# Patient Record
Sex: MALE | Race: WHITE | Employment: STUDENT | URBAN - METROPOLITAN AREA
[De-identification: names, ages, dates, MRNs, and addresses within clinical notes are randomized per-mention and may not be internally consistent; named-entity substitution may affect disease eponyms.]

---
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
## 2023-03-16 ENCOUNTER — OFFICE VISIT (OUTPATIENT)
Dept: INTERNAL MEDICINE | Facility: CLINIC | Age: 18
End: 2023-03-16
Payer: COMMERCIAL

## 2023-03-16 VITALS
RESPIRATION RATE: 12 BRPM | OXYGEN SATURATION: 99 % | DIASTOLIC BLOOD PRESSURE: 67 MMHG | BODY MASS INDEX: 25.48 KG/M2 | HEART RATE: 51 BPM | HEIGHT: 69 IN | WEIGHT: 172 LBS | SYSTOLIC BLOOD PRESSURE: 111 MMHG | TEMPERATURE: 98.4 F

## 2023-03-16 DIAGNOSIS — Z00.00 ROUTINE HISTORY AND PHYSICAL EXAMINATION OF ADULT: Primary | ICD-10-CM

## 2023-03-16 PROCEDURE — 99395 PREV VISIT EST AGE 18-39: CPT | Performed by: INTERNAL MEDICINE

## 2023-03-16 ASSESSMENT — ENCOUNTER SYMPTOMS
PALPITATIONS: 0
HEARTBURN: 0
PARESTHESIAS: 0
FREQUENCY: 0
MYALGIAS: 0
FEVER: 0
SORE THROAT: 0
JOINT SWELLING: 0
HEADACHES: 0
ARTHRALGIAS: 0
NERVOUS/ANXIOUS: 0
DIARRHEA: 0
SHORTNESS OF BREATH: 0
DYSURIA: 0
CONSTIPATION: 0
ABDOMINAL PAIN: 0
HEMATOCHEZIA: 0
COUGH: 0
HEMATURIA: 0
EYE PAIN: 0
WEAKNESS: 0
DIZZINESS: 0
CHILLS: 0
NAUSEA: 0

## 2023-03-16 NOTE — PROGRESS NOTES
SUBJECTIVE:   CC: Wai is an 17 year old who presents for preventative health visit.     Patient has been advised of split billing requirements and indicates understanding: Yes     Healthy Habits:     Getting at least 3 servings of Calcium per day:  Yes    Bi-annual eye exam:  Yes    Dental care twice a year:  Yes    Sleep apnea or symptoms of sleep apnea:  None    Diet:  Regular (no restrictions)    Frequency of exercise:  4-5 days/week    Duration of exercise:  Greater than 60 minutes    Taking medications regularly:  Yes    Medication side effects:  None    PHQ-2 Total Score: 0    Additional concerns today:  No    Takes no regularly scheduled medications     Patient brought with him today, a hand written page of notes about his family history and is asking some questions     1. Father at age of 49 had a big colon polyp, non-cancerous and several smaller colon polyps removed, when should this patient start with colon cancer screening  ?    2. Father,mother and maternal grandparents have anxiety     3. Also running in his family is diabetes mellitus type 2  And esophageal cancer in paternal grandmother , this was diagnosed when she was 68 years old.    I shared that I thought he should perhaps start with colon cancer at 38 but beyond this I don't think these are inherited diseases. I agreed to ask an informal question to the genetic counselor but in my opinion there's not enough here to warrant a separate specific office visit with a genetic counseling provider.    Today's PHQ-2 Score:   PHQ-2 ( 1999 [employer]) 3/16/2023   Q1: Little interest or pleasure in doing things 0   Q2: Feeling down, depressed or hopeless 0   PHQ-2 Score 0   PHQ-2 Total Score (12-17 Years)- Positive if 3 or more points; Administer PHQ-A if positive -   Q1: Little interest or pleasure in doing things Not at all   Q2: Feeling down, depressed or hopeless Not at all   PHQ-2 Score 0           Social History     Tobacco Use     Smoking status: Never      Smokeless tobacco: Never   Substance Use Topics     Alcohol use: No         Alcohol Use 3/16/2023   Prescreen: >3 drinks/day or >7 drinks/week? No   No flowsheet data found.    Last PSA: No results found for: PSA    Reviewed orders with patient. Reviewed health maintenance and updated orders accordingly - Yes  Lab work is in process  Labs reviewed in EPIC  BP Readings from Last 3 Encounters:   03/16/23 111/67   12/06/21 132/79   12/04/21 131/74    Wt Readings from Last 3 Encounters:   03/16/23 78 kg (172 lb) (74 %, Z= 0.65)*   08/14/22 69.4 kg (153 lb) (52 %, Z= 0.04)*   12/06/21 69.4 kg (153 lb) (56 %, Z= 0.15)*     * Growth percentiles are based on [facility] (Boys, 2-20 Years) data.                  Patient Active Problem List   Diagnosis     Restless leg syndrome     Flexural atopic dermatitis     KP (keratosis pilaris)     Family history of polyps in the colon     Nevus     Anxiety     No past surgical history on file.    Social History     Tobacco Use     Smoking status: Never     Smokeless tobacco: Never   Substance Use Topics     Alcohol use: No     Family History   Problem Relation Age of Onset     No Known Problems Mother      No Known Problems Father          No current outpatient medications on file.     No Known Allergies  No lab results found.     Reviewed and updated as needed this visit by clinical staff    Allergies  Meds              Reviewed and updated as needed this visit by Provider                     Review of Systems  CONSTITUTIONAL: NEGATIVE for fever, chills, change in weight  INTEGUMENTARY/SKIN: NEGATIVE for worrisome rashes, moles or lesions  EYES: NEGATIVE for vision changes or irritation  ENT: NEGATIVE for ear, mouth and throat problems  RESP: NEGATIVE for significant cough or SOB  CV: NEGATIVE for chest pain, palpitations or peripheral edema  GI: NEGATIVE for nausea, abdominal pain, heartburn, or change in bowel habits   male: negative for dysuria, hematuria, decreased urinary  "stream, erectile dysfunction, urethral discharge  MUSCULOSKELETAL: NEGATIVE for significant arthralgias or myalgia  NEURO: NEGATIVE for weakness, dizziness or paresthesias  PSYCHIATRIC: NEGATIVE for changes in mood or affect    Patient has some intermittent on and off right shoulder pain.     Health Maintenance Due   Topic Date Due     ANNUAL REVIEW OF  ORDERS  Never done     HIV SCREENING  Never done     HEPATITIS C SCREENING  Never done     YEARLY PREVENTIVE VISIT  03/11/2022      Patient declines laboratory studies  Self-testicular exams encouraged     OBJECTIVE:   /67   Pulse 51   Temp 98.4  F (36.9  C)   Resp 12   Ht 5' 9\" (1.753 m)   Wt 172 lb (78 kg)   SpO2 99%   BMI 25.40 kg/m      Physical Exam  GENERAL: healthy, alert and no distress  EYES: Eyes grossly normal to inspection, PERRL and conjunctivae and sclerae normal  HENT: ear canals and TM's normal, nose and mouth without ulcers or lesions  NECK: no adenopathy, no asymmetry, masses, or scars and thyroid normal to palpation  RESP: lungs clear to auscultation - no rales, rhonchi or wheezes  CV: regular rate and rhythm, normal S1 S2, no S3 or S4, no murmur, click or rub, no peripheral edema and peripheral pulses strong  ABDOMEN: soft, nontender, no hepatosplenomegaly, no masses and bowel sounds normal  MS: no gross musculoskeletal defects noted, no edema  SKIN: no suspicious lesions or rashes  NEURO: Normal strength and tone, mentation intact and speech normal  PSYCH: mentation appears normal, affect normal/bright    Diagnostic Test Results:  Labs reviewed in Epic    ASSESSMENT/PLAN:   (Z00.00) Routine history and physical examination of adult  (primary encounter diagnosis)  Comment: routine screening issues   Plan: see orders section of this encounter     Patient has been advised of split billing requirements and indicates understanding: Yes      COUNSELING:   Reviewed preventive health counseling, as reflected in patient " "instructions      BMI:   Estimated body mass index is 25.4 kg/m  as calculated from the following:    Height as of this encounter: 5' 9\" (1.753 m).    Weight as of this encounter: 172 lb (78 kg).         He reports that he has never smoked. He has never used smokeless tobacco.            Isidoro Marcos MD  [facility]  "

## 2023-03-16 NOTE — Clinical Note
I want to ask these questions of a genetic counseling provider. I don't think any of this rise to the level of warranting a formal consult but would appreciate feedback !  Patient brought with him today, a hand written page of notes about his family history and is asking some questions   1. Father at age of 49 had a big colon polyp, non-cancerous and several smaller colon polyps removed, when should this patient start with colon cancer screening  ?  2. Father,mother and maternal grandparents have anxiety   3. Also running in his family is diabetes mellitus type 2  And esophageal cancer in paternal grandmother , this was diagnosed when she was 68 years old.  I shared that I thought he should perhaps start with colon cancer at 38 but beyond this I don't think these are inherited diseases. I agreed to ask an informal question to the genetic counselor but in my opinion there's not enough here to warrant a separate specific office visit with a genetic counseling provider.

## 2023-03-17 ENCOUNTER — TELEPHONE (OUTPATIENT)
Dept: INTERNAL MEDICINE | Facility: CLINIC | Age: 18
End: 2023-03-17

## 2023-03-17 NOTE — TELEPHONE ENCOUNTER
Isidoro Marcos MD  [facility]  I want to ask these questions of a genetic counseling provider. I don't think any of this rise to the level of warranting a formal consult but would appreciate feedback !     Patient brought with him today, a hand written page of notes about his family history and is asking some questions     1. Father at age of 49 had a big colon polyp, non-cancerous and several smaller colon polyps removed, when should this patient start with colon cancer screening  ?     2. Father,mother and maternal grandparents have anxiety     3. Also running in his family is diabetes mellitus type 2  And esophageal cancer in paternal grandmother , this was diagnosed when she was 68 years old.     I shared that I thought he should perhaps start with colon cancer at 38 but beyond this I don't think these are inherited diseases. I agreed to ask an informal question to the genetic counselor but in my opinion there's not enough here to warrant a separate specific office visit with a genetic counseling provider.

## 2024-02-15 ENCOUNTER — PATIENT OUTREACH (OUTPATIENT)
Dept: CARE COORDINATION | Facility: CLINIC | Age: 19
End: 2024-02-15
Payer: COMMERCIAL

## 2024-02-29 ENCOUNTER — PATIENT OUTREACH (OUTPATIENT)
Dept: CARE COORDINATION | Facility: CLINIC | Age: 19
End: 2024-02-29
Payer: COMMERCIAL

## 2024-06-02 ENCOUNTER — MYC MEDICAL ADVICE (OUTPATIENT)
Dept: INTERNAL MEDICINE | Facility: CLINIC | Age: 19
End: 2024-06-02
Payer: COMMERCIAL

## 2024-06-03 ENCOUNTER — NURSE TRIAGE (OUTPATIENT)
Dept: FAMILY MEDICINE | Facility: CLINIC | Age: 19
End: 2024-06-03
Payer: COMMERCIAL

## 2024-06-03 NOTE — TELEPHONE ENCOUNTER
Pt is having shoulder popping and when this happens it hurts and then goes in place. Then it is really sore for a day.   He states that he could barely lift up his arm for a couple of days after it happened, but now he doesn't have any pain.  He is not sure if it is just one specific shoulder.   Having trouble with right shoulder right now  The other day when he lifted it up, he heard a loud cracking sound.  States that he does lift weights at the gym.  He denies any swelling, weakness, or numbness.  Pt denying any shoulder or arm pain currently.   He wanted to schedule appointment with PCP to discuss further or see if referral for PT would be a good idea.  Appt scheduled.    Reason for Disposition   Caused by overuse from recent vigorous activity (e.g., sports, lifting, physical work)    Additional Information   Negative: Shock suspected (e.g., cold/pale/clammy skin, too weak to stand, low BP, rapid pulse)   Negative: Similar pain previously and it was from 'heart attack'   Negative: Similar pain previously from 'angina' and not relieved by nitroglycerin   Negative: Sounds like a life-threatening emergency to the triager   Negative: Followed an injury to arm   Negative: Chest pain   Negative: Wound looks infected   Negative: Elbow pain is main symptom   Negative: Hand or wrist pain is main symptom   Negative: Fever and red area (or area very tender to touch)   Negative: Swollen joint and fever   Negative: Entire arm is swollen   Negative: Patient sounds very sick or weak to the triager   Negative: Difficulty breathing or unusual sweating (e.g., sweating without exertion)   Negative: Chest pain lasting longer than 5 minutes   Negative: Age > 40 and no obvious cause for pain, pain still present even when not moving the arm   Negative: SEVERE pain (e.g., excruciating, unable to do any normal activities)   Negative: Red area or streak > 2 inches (or 5 cm)   Negative: Cast on wrist or arm and now increasing pain    Negative: Weakness (i.e., loss of strength) in hand or fingers  (Exception: Not truly weak; hand feels weak because of pain.)   Negative: Arm pains with exertion (e.g., occurs with walking; goes away on resting)   Negative: MILD pain (e.g., does not interfere with normal activities) and present > 7 days   Negative: MODERATE pain (e.g., interferes with normal activities) and present > 3 days   Negative: Pain is worsened or caused by bending the neck   Negative: Painful rash with multiple small blisters grouped together (i.e., dermatomal distribution or 'band' or 'stripe')   Negative: Looks like a boil, infected sore, deep ulcer, or other infected rash (spreading redness, pus)   Negative: Localized rash is very painful (no fever)   Negative: Numbness (i.e., loss of sensation) in hand or fingers   Negative: Localized pain, redness or hard lump along vein   Negative: Patient wants to be seen    Protocols used: Arm Pain-A-[place]  Sherie Alonso RN

## 2024-06-03 NOTE — TELEPHONE ENCOUNTER
Left message on answering machine for patient to call back to the nurse at [phone].    Please triage shoulder pain when patient returns call.    Britney Hernandez RN  [facility]

## 2024-06-22 ENCOUNTER — HEALTH MAINTENANCE LETTER (OUTPATIENT)
Age: 19
End: 2024-06-22

## 2024-07-18 ENCOUNTER — OFFICE VISIT (OUTPATIENT)
Dept: INTERNAL MEDICINE | Facility: CLINIC | Age: 19
End: 2024-07-18
Payer: COMMERCIAL

## 2024-07-18 VITALS
HEART RATE: 67 BPM | RESPIRATION RATE: 16 BRPM | DIASTOLIC BLOOD PRESSURE: 71 MMHG | SYSTOLIC BLOOD PRESSURE: 124 MMHG | WEIGHT: 176 LBS | OXYGEN SATURATION: 98 % | TEMPERATURE: 98.6 F | BODY MASS INDEX: 26.07 KG/M2 | HEIGHT: 69 IN

## 2024-07-18 DIAGNOSIS — Z11.59 NEED FOR HEPATITIS C SCREENING TEST: ICD-10-CM

## 2024-07-18 DIAGNOSIS — G89.29 CHRONIC PAIN OF BOTH SHOULDERS: Primary | ICD-10-CM

## 2024-07-18 DIAGNOSIS — Z11.4 SCREENING FOR HIV (HUMAN IMMUNODEFICIENCY VIRUS): ICD-10-CM

## 2024-07-18 DIAGNOSIS — M25.511 CHRONIC PAIN OF BOTH SHOULDERS: Primary | ICD-10-CM

## 2024-07-18 DIAGNOSIS — M25.512 CHRONIC PAIN OF BOTH SHOULDERS: Primary | ICD-10-CM

## 2024-07-18 PROCEDURE — 99213 OFFICE O/P EST LOW 20 MIN: CPT | Performed by: INTERNAL MEDICINE

## 2024-07-18 ASSESSMENT — PAIN SCALES - GENERAL: PAINLEVEL: NO PAIN (0)

## 2024-07-18 NOTE — PROGRESS NOTES
"  Assessment & Plan     Chronic pain of both shoulders  Seeing this patient for a chronic problem with his bilateral shoulders although by far left greater then right side is the problem. He has episodes where, especially associated with his weight lifting he notes at times a popping sound and then has a lingering pain with the shoulder for as long as a week. Given the fact that this has been a persistent problem for a year I am concerned with what the future holds  for this gentleman and while he has a normal exam today with no pain and a full range of motion with both shoulders and what sounds like normal muscle power, tone and bulk , his history concerns me and I felt a session with the physical therapy was the logical next step   - REVIEW OF HEALTH MAINTENANCE PROTOCOL ORDERS  - Physical Therapy  Referral; Future    Screening for HIV (human immunodeficiency virus)  Postponed     Need for hepatitis C screening test  Postponed           BMI  Estimated body mass index is 25.83 kg/m  as calculated from the following:    Height as of this encounter: 1.758 m (5' 9.21\").    Weight as of this encounter: 79.8 kg (176 lb).       Aaron Carreno is a 18 year old, presenting for the following health issues:  Shoulder (Bilateral shoulder pain and cracking)      7/18/2024    12:00 PM   Additional Questions   Roomed by Olga     History of Present Illness       Reason for visit:  Shoulder pain  Symptom onset:  More than a month  Symptoms include:  Pain in shoulder while working out and pain when putting shoulder in a weird position  Symptom intensity:  Moderate  Symptom progression:  Improving  Had these symptoms before:  Yes  Has tried/received treatment for these symptoms:  No  What makes it worse:  Working out when it already hurts  What makes it better:  Taking a break from the gym    He eats 2-3 servings of fruits and vegetables daily.He consumes 1 sweetened beverage(s) daily.He exercises with enough effort to " "increase his heart rate 60 or more minutes per day.  He exercises with enough effort to increase his heart rate 5 days per week.   He is taking medications regularly.     Symptoms have been present for around a year   Not constant pain  Sometimes just from overstretching he gets a popping sound and from thsese events he has a [pain that lingers for 4-6 days     He tries to to power weight lifting and has bilateral shoulder problems         Review of Systems  Constitutional, HEENT, cardiovascular, pulmonary, gi and gu systems are negative, except as otherwise noted.      Objective    /71   Pulse 67   Temp 98.6  F (37  C) (Temporal)   Resp 16   Ht 1.758 m (5' 9.21\")   Wt 79.8 kg (176 lb)   SpO2 98%   BMI 25.83 kg/m    Body mass index is 25.83 kg/m .  Physical Exam   GENERAL: alert and no distress  EYES: Eyes grossly normal to inspection, PERRL and conjunctivae and sclerae normal  MS: no gross musculoskeletal defects noted, no edema  NEURO: Normal strength and tone, mentation intact and speech normal  PSYCH: mentation appears normal, affect normal/bright    Orders Placed This Encounter   Procedures    REVIEW OF HEALTH MAINTENANCE PROTOCOL ORDERS    Physical Therapy  Referral             Signed Electronically by: Isidoro Marcos MD    "

## 2024-08-13 ENCOUNTER — THERAPY VISIT (OUTPATIENT)
Dept: PHYSICAL THERAPY | Facility: CLINIC | Age: 19
End: 2024-08-13
Attending: INTERNAL MEDICINE
Payer: COMMERCIAL

## 2024-08-13 DIAGNOSIS — G89.29 CHRONIC PAIN OF BOTH SHOULDERS: ICD-10-CM

## 2024-08-13 DIAGNOSIS — M25.512 CHRONIC PAIN OF BOTH SHOULDERS: ICD-10-CM

## 2024-08-13 DIAGNOSIS — M25.511 CHRONIC PAIN OF BOTH SHOULDERS: ICD-10-CM

## 2024-08-13 PROCEDURE — 97161 PT EVAL LOW COMPLEX 20 MIN: CPT | Mod: GP | Performed by: PHYSICAL THERAPIST

## 2024-08-13 PROCEDURE — 97110 THERAPEUTIC EXERCISES: CPT | Mod: GP | Performed by: PHYSICAL THERAPIST

## 2024-08-13 PROCEDURE — 97530 THERAPEUTIC ACTIVITIES: CPT | Mod: GP | Performed by: PHYSICAL THERAPIST

## 2024-08-13 ASSESSMENT — ACTIVITIES OF DAILY LIVING (ADL)
WASHING_YOUR_HAIR?: 0
PUTTING_ON_AN_UNDERSHIRT_OR_A_PULLOVER_SWEATER: 3
AT_ITS_WORST?: 9
PUTTING_ON_YOUR_PANTS: 0
CARRYING_A_HEAVY_OBJECT_OF_10_POUNDS: 6
REMOVING_SOMETHING_FROM_YOUR_BACK_POCKET: 0
PLEASE_INDICATE_YOR_PRIMARY_REASON_FOR_REFERRAL_TO_THERAPY:: SHOULDER
PUTTING_ON_A_SHIRT_THAT_BUTTONS_DOWN_THE_FRONT: 0
PLACING_AN_OBJECT_ON_A_HIGH_SHELF: 5
WASHING_YOUR_BACK: 0
WHEN_LYING_ON_THE_INVOLVED_SIDE: 6
PUSHING_WITH_THE_INVOLVED_ARM: 3
REACHING_FOR_SOMETHING_ON_A_HIGH_SHELF: 5

## 2024-08-13 NOTE — PROGRESS NOTES
PHYSICAL THERAPY EVALUATION  Type of Visit: Evaluation              Subjective   Shoulder pain began a little over 2 years ago and has been progressively getting worse especially within the last 3 months. He has began to notice his shoulders popping out and going quickly back in - he will have pain and soreness for several days after this happens. The last time this happens was 3 days ago on the right shoulder when he was going to turn the key to start his car. Denies any history of dislocations in shoulders or any joint.      Presenting condition or subjective complaint: when extending my arm in no specific way,sometimes it feels like my shoulder pops out of place. This causes extreme pain then pops right back in place. then depending on how bad the incident was, it will be sore for around a week.  Date of onset:      Relevant medical history:   none  Dates & types of surgery:  none    Prior diagnostic imaging/testing results:     none  Prior therapy history for the same diagnosis, illness or injury: No      Prior Level of Function  Transfers: Independent  Ambulation: Independent  ADL: Independent  IADL:     Living Environment  Social support: With family members   Type of home: House; Multi-level; Basement   Stairs to enter the home: No       Ramp: No   Stairs inside the home: Yes 2 Is there a railing: Yes     Help at home: Self Cares (home health aide/personal care attendant, family, etc); Home management tasks (cooking, cleaning); Medication and/or finances  Equipment owned:       Employment:    college student  Hobbies/Interests:      Patient goals for therapy: I would like it to stop happening all together becuase it prevents me from doing certain exercises when working out and is very painful.    Pain assessment: See objective evaluation for additional pain details     Objective   SHOULDER EVALUATION  PAIN: Pain Level at Rest: 4/10  Pain Level with Use: 7/10  Pain Location: anterior shoulder  Pain Quality:  Aching and Sharp  Pain Frequency: intermittent  Pain is Worst: no pattern  Pain is Exacerbated By: reaching out in front of him away from body, reaching overhead forcefully   Pain is Relieved By: rest  Pain Progression: Worsened  INTEGUMENTARY (edema, incisions): WNL  POSTURE: Sitting Posture: Rounded shoulders, Forward head, Thoracic kyphosis increased  GAIT:   Weightbearing Status: WBAT  Assistive Device(s): None  Gait Deviations: WNL  BALANCE/PROPRIOCEPTION:  not tested  WEIGHTBEARING ALIGNMENT:   ROM: AROM WNL pain at end range flexion, abduction. Cervical AROM WNL    STRENGTH:  ER 4/5 bilat, abd 4+/5 bilat, flexion 4+/5 bilat, IR WNL  FLEXIBILITY:  decreased pec minor bilat  SPECIAL TESTS:    Left Right   Impingement     Neer's Positive Positive   Labral     Anterior Slide     [place]'s Negative  Negative    Crank     Instability     Apprehension (anterior) Negative  Negative    Relocation (anterior)     Anterior Load & Shift Negative  Negative    Posterior Load & Shift Negative  Negative    Posterior instability (with 90 degrees flex)     Multi-Directional Instability      Sulcus     Biceps      Speed's Negative  Negative    Rotator Cuff Tear     Drop Arm Negative  Negative    Belly Press     Lift off  Negative  Negative      PALPATION:  tender bilat supraspinatus, Uts, levators, pec minor  JOINT MOBILITY: not assessed d/t pain, will continue to assess  CERVICAL SCREEN: WNL    Assessment & Plan   CLINICAL IMPRESSIONS  Medical Diagnosis: B shoulder pain    Treatment Diagnosis: B shoulder pain   Impression/Assessment: Patient is a 18 year old male with B shoulder instability complaints.  The following significant findings have been identified: Pain, Decreased ROM/flexibility, Decreased joint mobility, Decreased strength, Decreased proprioception, and Impaired muscle performance. These impairments interfere with their ability to perform self care tasks, work tasks, recreational activities, household chores,  driving , household mobility, and community mobility as compared to previous level of function.     Clinical Decision Making (Complexity):  Clinical Presentation: Stable/Uncomplicated  Clinical Presentation Rationale: based on medical and personal factors listed in PT evaluation  Clinical Decision Making (Complexity): Low complexity    PLAN OF CARE  Treatment Interventions:  Interventions: Manual Therapy, Neuromuscular Re-education, Therapeutic Activity, Therapeutic Exercise    Long Term Goals            Frequency of Treatment:    Duration of Treatment:      Recommended Referrals to Other Professionals:   Education Assessment:        Risks and benefits of evaluation/treatment have been explained.   Patient/Family/caregiver agrees with Plan of Care.     Evaluation Time:             Signing Clinician: Tatiana Carreon, PT

## 2024-08-20 ENCOUNTER — THERAPY VISIT (OUTPATIENT)
Dept: PHYSICAL THERAPY | Facility: CLINIC | Age: 19
End: 2024-08-20
Payer: COMMERCIAL

## 2024-08-20 DIAGNOSIS — M25.511 BILATERAL SHOULDER PAIN: Primary | ICD-10-CM

## 2024-08-20 DIAGNOSIS — M25.512 BILATERAL SHOULDER PAIN: Primary | ICD-10-CM

## 2024-08-20 PROCEDURE — 97112 NEUROMUSCULAR REEDUCATION: CPT | Mod: GP | Performed by: PHYSICAL THERAPIST

## 2024-08-20 PROCEDURE — 97110 THERAPEUTIC EXERCISES: CPT | Mod: GP | Performed by: PHYSICAL THERAPIST

## 2024-09-12 ENCOUNTER — PATIENT OUTREACH (OUTPATIENT)
Dept: CARE COORDINATION | Facility: CLINIC | Age: 19
End: 2024-09-12
Payer: COMMERCIAL

## 2025-07-12 ENCOUNTER — HEALTH MAINTENANCE LETTER (OUTPATIENT)
Age: 20
End: 2025-07-12